# Patient Record
(demographics unavailable — no encounter records)

---

## 2024-12-02 NOTE — ASSESSMENT
[FreeTextEntry1] : Ms. CARTY 's questions were answered to her satisfaction.  She  expressed her  understanding and willingness to comply with the above recommendations, and  will return to the office in 6 months.

## 2024-12-02 NOTE — HISTORY OF PRESENT ILLNESS
[de-identified] : 70F  with PMHx of GERD, DM, spinal stenosis, bilateral carpal tunnel syndrome, diagnosed with follicular lymphoma, low-grade, bulky intra-abdominal disease in 2016.  CASE SYNOPSIS: 5/25/2016: CT A/P-extensive, bulky, malignant intraperitoneal and retroperitoneal mass extending from mid abdomen to mid pelvis, enveloping the aorta and inferior vena cava and the retrocrural spaces, bilaterally down into the upper pelvis, infiltrating and thickening the right paracolic peritoneal reflections. 7/6/2016: CT-guided core biopsy FNA: CD10 positive B-cell lymphoma consistent with low-grade follicular lymphoma. 7/21/2016: Bone marrow biopsy/aspirate-monotypic B-cell (0.4% of cells), positive for kappa, CD19, CD20, CD10, FMC 7, CD23; negative CD5, consistent with CD10 positive B-cell lymphoma. 8/11-12/29/16: Bendamustine/rituximab x 6 cycles 6/2017-3/2019: Rituximab maintenance 3/26/2019: PET/CT-retroperitoneal soft tissue stranding with FDG avid ET equal to mediastinal background probably treated disease; no evidence of recurrent lymphoma. March 2021: covid vaccine x 2 ( Moderna) 6/24/21: CT A/P- ill-defined retroperitoneal adenopathy with representative left periaortic node measuring 2 x 1.8 cm (previously approximately 1 x 0.9 cm).  Mesenteric adenopathy is new since prior, with representative node measuring 2.2 x 1.5 cm in 2018, difficult to measure on PET/CT of 2019.  Infiltrative changes in the root of the mesentery are similar to prior.  Conclusion: new mesenteric and enlarged retroperitoneal adenopathy. 9/2021- C diff colitis after short course of amoxicillin. 11/10/21-PET scan: few new enlarged FDG avid mesenteric and retroperitoneal lymph node compatible with recurrent lymphoma.  New small, mildly avid left axillary lymph node nonspecific.  Remainder of study is unchanged. 1/28/2022: Acute Covid infection 3/5/2022- CT A/P: increased mesenteric adenopathy and essentially unchanged retroperitoneal adenopathy. 12/15/2022 CT A/P-slight increase in mesenteric adenopathy, smaller retroperitoneal lymphadenopathy 6/22/2023 CT A/P: Mesenteric adenopathy slightly increased since prior examination 12/15/2022 (largest metastasis measures 5.4 x 4.0 cm, previously 4.6 x 3.6 cm..  Subcentimeter retroperitoneal lymphadenopathy unchanged. 12/18/2024- CT A/P:IMPRESSION: Dominant mesenteric shadi mass involving mesenteric vessels and small bowel has enlarged since prior studies. More inferiorly, a smaller central mesenteric node has slightly decreased in size. 5/24- 6/3/2024 admitted at [facility] with acute renal colic secondary to nephrolithiasis and sepsis; completes 2-week course antibiotics.  [FreeTextEntry1] : expectant surveillance\par  \par   [de-identified] : Ms. CARTY is feeling well since her last visit in June 2024.  In July she had ureteral stent placement with extraction of a calculus.  Patient known with nephrolithiasis and cluster headaches.  Earlier last month she was seen in consultation by neurologist (Dr. Cole Rose) for tension headache and recommended MRI brain-test negative.  Did not have restaging CTs since December 2023 that showed slightly enlarged dominant mesenteric shadi mass.  Patient has recently returned from [place] where she had dental work complicated with dental abscess.

## 2024-12-02 NOTE — REVIEW OF SYSTEMS
[Recent Change In Weight] : ~T recent weight change [Cough] : cough [Negative] : Integumentary [FreeTextEntry2] : gaining weight [FreeTextEntry6] : Nonproductive, irritative

## 2024-12-02 NOTE — HISTORY OF PRESENT ILLNESS
[de-identified] : 70F  with PMHx of GERD, DM, spinal stenosis, bilateral carpal tunnel syndrome, diagnosed with follicular lymphoma, low-grade, bulky intra-abdominal disease in 2016.  CASE SYNOPSIS: 5/25/2016: CT A/P-extensive, bulky, malignant intraperitoneal and retroperitoneal mass extending from mid abdomen to mid pelvis, enveloping the aorta and inferior vena cava and the retrocrural spaces, bilaterally down into the upper pelvis, infiltrating and thickening the right paracolic peritoneal reflections. 7/6/2016: CT-guided core biopsy FNA: CD10 positive B-cell lymphoma consistent with low-grade follicular lymphoma. 7/21/2016: Bone marrow biopsy/aspirate-monotypic B-cell (0.4% of cells), positive for kappa, CD19, CD20, CD10, FMC 7, CD23; negative CD5, consistent with CD10 positive B-cell lymphoma. 8/11-12/29/16: Bendamustine/rituximab x 6 cycles 6/2017-3/2019: Rituximab maintenance 3/26/2019: PET/CT-retroperitoneal soft tissue stranding with FDG avid ET equal to mediastinal background probably treated disease; no evidence of recurrent lymphoma. March 2021: covid vaccine x 2 ( Moderna) 6/24/21: CT A/P- ill-defined retroperitoneal adenopathy with representative left periaortic node measuring 2 x 1.8 cm (previously approximately 1 x 0.9 cm).  Mesenteric adenopathy is new since prior, with representative node measuring 2.2 x 1.5 cm in 2018, difficult to measure on PET/CT of 2019.  Infiltrative changes in the root of the mesentery are similar to prior.  Conclusion: new mesenteric and enlarged retroperitoneal adenopathy. 9/2021- C diff colitis after short course of amoxicillin. 11/10/21-PET scan: few new enlarged FDG avid mesenteric and retroperitoneal lymph node compatible with recurrent lymphoma.  New small, mildly avid left axillary lymph node nonspecific.  Remainder of study is unchanged. 1/28/2022: Acute Covid infection 3/5/2022- CT A/P: increased mesenteric adenopathy and essentially unchanged retroperitoneal adenopathy. 12/15/2022 CT A/P-slight increase in mesenteric adenopathy, smaller retroperitoneal lymphadenopathy 6/22/2023 CT A/P: Mesenteric adenopathy slightly increased since prior examination 12/15/2022 (largest metastasis measures 5.4 x 4.0 cm, previously 4.6 x 3.6 cm..  Subcentimeter retroperitoneal lymphadenopathy unchanged. 12/18/2024- CT A/P:IMPRESSION: Dominant mesenteric shadi mass involving mesenteric vessels and small bowel has enlarged since prior studies. More inferiorly, a smaller central mesenteric node has slightly decreased in size. 5/24- 6/3/2024 admitted at [facility] with acute renal colic secondary to nephrolithiasis and sepsis; completes 2-week course antibiotics.  [FreeTextEntry1] : expectant surveillance\par  \par   [de-identified] : Ms. CARTY is feeling well since her last visit in June 2024.  In July she had ureteral stent placement with extraction of a calculus.  Patient known with nephrolithiasis and cluster headaches.  Earlier last month she was seen in consultation by neurologist (Dr. Cole Rose) for tension headache and recommended MRI brain-test negative.  Did not have restaging CTs since December 2023 that showed slightly enlarged dominant mesenteric shadi mass.  Patient has recently returned from [place] where she had dental work complicated with dental abscess.

## 2025-03-27 NOTE — HISTORY OF PRESENT ILLNESS
[de-identified] : 70F  with PMHx of GERD, DM, spinal stenosis, bilateral carpal tunnel syndrome, diagnosed with follicular lymphoma, low-grade, bulky intra-abdominal disease in 2016.  CASE SYNOPSIS: 5/25/2016: CT A/P-extensive, bulky, malignant intraperitoneal and retroperitoneal mass extending from mid abdomen to mid pelvis, enveloping the aorta and inferior vena cava and the retrocrural spaces, bilaterally down into the upper pelvis, infiltrating and thickening the right paracolic peritoneal reflections. 7/6/2016: CT-guided core biopsy FNA: CD10 positive B-cell lymphoma consistent with low-grade follicular lymphoma. 7/21/2016: Bone marrow biopsy/aspirate-monotypic B-cell (0.4% of cells), positive for kappa, CD19, CD20, CD10, FMC 7, CD23; negative CD5, consistent with CD10 positive B-cell lymphoma. 8/11-12/29/16: Bendamustine/rituximab x 6 cycles 6/2017-3/2019: Rituximab maintenance 3/26/2019: PET/CT-retroperitoneal soft tissue stranding with FDG avid ET equal to mediastinal background probably treated disease; no evidence of recurrent lymphoma. March 2021: covid vaccine x 2 ( Moderna) 6/24/21: CT A/P- ill-defined retroperitoneal adenopathy with representative left periaortic node measuring 2 x 1.8 cm (previously approximately 1 x 0.9 cm).  Mesenteric adenopathy is new since prior, with representative node measuring 2.2 x 1.5 cm in 2018, difficult to measure on PET/CT of 2019.  Infiltrative changes in the root of the mesentery are similar to prior.  Conclusion: new mesenteric and enlarged retroperitoneal adenopathy. 9/2021- C diff colitis after short course of amoxicillin. 11/10/21-PET scan: few new enlarged FDG avid mesenteric and retroperitoneal lymph node compatible with recurrent lymphoma.  New small, mildly avid left axillary lymph node nonspecific.  Remainder of study is unchanged. 1/28/2022: Acute Covid infection 3/5/2022- CT A/P: increased mesenteric adenopathy and essentially unchanged retroperitoneal adenopathy. 12/15/2022 CT A/P-slight increase in mesenteric adenopathy, smaller retroperitoneal lymphadenopathy 6/22/2023 CT A/P: Mesenteric adenopathy slightly increased since prior examination 12/15/2022 (largest metastasis measures 5.4 x 4.0 cm, previously 4.6 x 3.6 cm..  Subcentimeter retroperitoneal lymphadenopathy unchanged. 12/18/2024- CT A/P:IMPRESSION: Dominant mesenteric shadi mass involving mesenteric vessels and small bowel has enlarged since prior studies. More inferiorly, a smaller central mesenteric node has slightly decreased in size. 5/24- 6/3/2024 admitted at [facility] with acute renal colic secondary to nephrolithiasis and sepsis; completes 2-week course antibiotics. 3/1/2025 CT abdomen- IMPRESSION:  Since 12/20/2024, overall mild interval increased size of the conglomerate mesenteric shadi mass, similarly encasing the superior mesenteric vasculature, measures 3.9 x 3.5 x 10 cm, previously measured 3.6 x 3 x 10 cm when measured in a similar fashion, in keeping with known malignancy. Multiple additional prominent retroperitoneal lymph nodes are not significantly changed.   [FreeTextEntry1] : expectant surveillance\par  \par   [de-identified] : Reporting no significant changes in clinical status since last visit in December 2024, despite recent CT abdomen from 3/1/2025  showing mild interval increase size of the conglomerate mesenteric shadi mass encasing the superior mesenteric vasculature.  Denies B symptoms, hospitalizations, urgent care visits, etc. States she has presented facial sweating in the past, but it resolved. No new lithotripsy procedure since July 2024.  Physical examination unchanged from previous visit.  Appetite and weight preserved.  Continues to travel, has no limitations in physical activity.  Hematologic profile stable, with inflammatory markers ESR, beta-2 microglobulin and LDH in normal range.

## 2025-03-27 NOTE — ASSESSMENT
[FreeTextEntry1] : Ms. CARYT 's questions were answered to her satisfaction.  She  expressed her  understanding and willingness to comply with the above recommendations.

## 2025-03-27 NOTE — REASON FOR VISIT
[Follow-Up Visit] : a follow-up visit for [Spouse] : spouse [FreeTextEntry2] : follicular lymphoma (intra-abdominal)

## 2025-06-18 NOTE — RESULTS/DATA
After your visit at the [facility] today,  please direct any follow up questions or medication needs to the staff in our [place] office so that your concerns may be promptly addressed.  We are available through [employer] or at the numbers below:    The phone number is:   [phone] option #1    The fax number is:  [phone]    Your pharmacy should also send any requests electronically to the [place] office.  Refill policies:    Allow 2-3 business days for refills; controlled substances may take longer.  Contact your pharmacy at least 5 days prior to running out of medication and have them send an electronic request or submit request through the “request refill” option in your [employer] account.  Refills are not addressed on weekends; covering physicians do not authorize routine medications on weekends.  No narcotics or controlled substances are refilled after noon on Fridays or by on call physicians.  By law, narcotics must be electronically prescribed.  A 30 day supply with no refills is the maximum allowed.  If your prescription is due for a refill, you may be due for a follow up appointment.  To best provide you care, patients receiving routine medications need to be seen at least once a year.  Patients receiving narcotic/controlled substance medications need to be seen at least once every 3 months.  In the event that your preferred pharmacy does not have the requested medication in stock (e.g. Backordered), it is your responsibility to find another pharmacy that has the requested medication available.  We will gladly send a new prescription to that pharmacy at your request.    Scheduling Tests:    If your physician has ordered radiology tests such as MRI or CT scans, please contact Central Scheduling at [phone] right away to schedule the test.  Once scheduled, the [facility] Centralized Referral Team will work with your insurance carrier to obtain pre-certification or prior authorization.   Depending on your insurance carrier, approval may take 3-10 days.  It is highly recommended patients assure they have received an authorization before having a test performed.  If test is done without insurance authorization, patient may be responsible for the entire amount billed.      Precertification and Prior Authorizations:  If your physician has recommended that you have a procedure or additional testing performed the [facility] Centralized Referral Team will contact your insurance carrier to obtain pre-certification or prior authorization.    You are strongly encouraged to contact your insurance carrier to verify that your procedure/test has been approved and is a COVERED benefit.  Although the [facility] Centralized Referral Team does its due diligence, the insurance carrier gives the disclaimer that \"Although the procedure is authorized, this does not guarantee payment.\"    Ultimately the patient is responsible for payment.   Thank you for your understanding in this matter.  Paperwork Completion:  If you require FMLA or disability paperwork for your recovery, please make sure to either drop it off or have it faxed to our office at [phone]. Be sure the form has your name and date of birth on it.  The form will be faxed to our Forms Department and they will complete it for you.  There is a 25$ fee for all forms that need to be filled out.  Please be aware there is a 10-14 day turnaround time.  You will need to sign a release of information (TOBIN) form if your paperwork does not come with one.  You may call the Forms Department with any questions at [phone].  Their fax number is [phone].     [FreeTextEntry1] : Blood work results, imaging studies, and pathology reports reviewed in detail with patient .\par  \par